# Patient Record
Sex: FEMALE | Race: WHITE | NOT HISPANIC OR LATINO | Employment: UNEMPLOYED | URBAN - METROPOLITAN AREA
[De-identification: names, ages, dates, MRNs, and addresses within clinical notes are randomized per-mention and may not be internally consistent; named-entity substitution may affect disease eponyms.]

---
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
## 2025-03-19 ENCOUNTER — APPOINTMENT (OUTPATIENT)
Dept: GENERAL RADIOLOGY | Facility: HOSPITAL | Age: 20
End: 2025-03-19
Payer: OTHER MISCELLANEOUS

## 2025-03-19 PROCEDURE — 73600 X-RAY EXAM OF ANKLE: CPT

## 2025-03-19 PROCEDURE — 99283 EMERGENCY DEPT VISIT LOW MDM: CPT

## 2025-03-19 PROCEDURE — 73590 X-RAY EXAM OF LOWER LEG: CPT

## 2025-03-20 ENCOUNTER — HOSPITAL ENCOUNTER (EMERGENCY)
Facility: HOSPITAL | Age: 20
Discharge: HOME | End: 2025-03-20
Attending: EMERGENCY MEDICINE
Payer: OTHER MISCELLANEOUS

## 2025-03-20 VITALS
DIASTOLIC BLOOD PRESSURE: 78 MMHG | BODY MASS INDEX: 24.99 KG/M2 | RESPIRATION RATE: 20 BRPM | TEMPERATURE: 98.7 F | OXYGEN SATURATION: 100 % | HEIGHT: 65 IN | HEART RATE: 83 BPM | WEIGHT: 150 LBS | SYSTOLIC BLOOD PRESSURE: 122 MMHG

## 2025-03-20 DIAGNOSIS — S93.402A SPRAIN OF LEFT ANKLE, UNSPECIFIED LIGAMENT, INITIAL ENCOUNTER: Primary | ICD-10-CM

## 2025-03-20 RX ORDER — ACETAMINOPHEN 500 MG
1000 TABLET ORAL ONCE
Status: COMPLETED | OUTPATIENT
Start: 2025-03-20 | End: 2025-03-20

## 2025-03-20 RX ADMIN — ACETAMINOPHEN 1000 MG: 500 TABLET, FILM COATED ORAL at 01:07

## 2025-03-20 RX ADMIN — IBUPROFEN 600 MG: 400 TABLET, FILM COATED ORAL at 01:07

## 2025-03-20 NOTE — ED PROVIDER NOTES
EMERGENCY DEPARTMENT ENCOUNTER  Room Number:  05/05  PCP: Provider, No Known  Independent Historians: Patient      HPI:  Chief Complaint: had concerns including Leg Injury.     A complete HPI/ROS/PMH/PSH/SH/FH are unobtainable due to: None    Chronic or social conditions impacting patient care (Social Determinants of Health): None      Context: Camila Mandujano is a 19 y.o. female who presents emergency department today with a left ankle injury after falling off a stepstool at work.  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  She denies numbness, tingling, or difficulty moving the toes.  She reports that she has had 2 other surgeries on this ankle for history of torn ligaments.  This was performed in [place].  She denies any other injuries associated with the fall.  She is not anticoagulated.      Review of prior external notes (non-ED) -and- Review of prior external test results outside of this encounter:   I reviewed labs from 9/1/2024, hemoglobin 14.2, creatinine 0.72      PAST MEDICAL HISTORY  Active Ambulatory Problems     Diagnosis Date Noted    No Active Ambulatory Problems     Resolved Ambulatory Problems     Diagnosis Date Noted    No Resolved Ambulatory Problems     No Additional Past Medical History         PAST SURGICAL HISTORY  No past surgical history on file.      FAMILY HISTORY  No family history on file.      SOCIAL HISTORY  Social History     Socioeconomic History    Marital status: Single   Tobacco Use    Smoking status: Unknown         ALLERGIES  Patient has no known allergies.      REVIEW OF SYSTEMS  Included in HPI  All systems reviewed and negative except for those discussed in HPI.      PHYSICAL EXAM    I have reviewed the triage vital signs and nursing notes.    ED Triage Vitals   Temp Heart Rate Resp BP SpO2   03/19/25 2221 03/19/25 2221 03/19/25 2221 03/19/25 2226 03/19/25 2221    98.7 °F (37.1 °C) 104 20 131/83 97 %      Temp src Heart Rate Source Patient Position BP Location FiO2 (%)   -- -- -- -- --              Physical Exam  Constitutional:       General: She is not in acute distress.     Appearance: Normal appearance.   HENT:      Head: Normocephalic and atraumatic.      Nose: Nose normal.      Mouth/Throat:      Mouth: Mucous membranes are moist.   Eyes:      Extraocular Movements: Extraocular movements intact.      Pupils: Pupils are equal, round, and reactive to light.   Cardiovascular:      Rate and Rhythm: Normal rate and regular rhythm.      Pulses: Normal pulses.      Heart sounds: Normal heart sounds.   Pulmonary:      Effort: Pulmonary effort is normal. No respiratory distress.      Breath sounds: Normal breath sounds.   Musculoskeletal:         General: Normal range of motion.      Cervical back: Normal range of motion and neck supple.      Comments: There is swelling to the lateral malleolus of the left ankle.  Range of motion limited secondary to pain.  Can move all toes without difficulty.  Sensation is intact to light touch throughout the bilateral lower extremities. Muscle strength is 5/5 and symmetrical with plantarflexion and EHL. DP and PT pulses are 2+ bilaterally.      Skin:     General: Skin is warm and dry.      Capillary Refill: Capillary refill takes less than 2 seconds.   Neurological:      General: No focal deficit present.      Mental Status: She is alert and oriented to person, place, and time.   Psychiatric:         Mood and Affect: Mood normal.         Behavior: Behavior normal.           RADIOLOGY  XR Tibia Fibula 2 View Left  XR Tibia Fibula 2 View Left, XR Ankle 2 View Left  Result Date: 3/19/2025  2 VIEWS LEFT TIBIA AND FIBULA; 2 VIEWS LEFT ANKLE  HISTORY: Fall  COMPARISON: March 25, 2022  FINDINGS: Left tibia and fibula: No acute fracture or subluxation of the left tibia or fibula is seen. There is no suprapatellar effusion. No aggressive osseous  abnormalities are seen.  Left ankle: No acute fracture or subluxation of the left ankle is seen. There is soft tissue swelling about the ankle, particularly overlying the lateral malleolus.      No acute fracture or subluxation identified.  This report was finalized on 3/19/2025 11:36 PM by Dr. Beth Andrade M.D on Workstation: [id number]          MEDICATIONS GIVEN IN ER  Medications   ibuprofen (ADVIL,MOTRIN) tablet 600 mg (has no administration in time range)   acetaminophen (TYLENOL) tablet 1,000 mg (has no administration in time range)         OUTPATIENT MEDICATION MANAGEMENT:  Current Facility-Administered Medications Ordered in Epic   Medication Dose Route Frequency Provider Last Rate Last Admin    acetaminophen (TYLENOL) tablet 1,000 mg  1,000 mg Oral Once Bridget Potts PA-C        ibuprofen (ADVIL,MOTRIN) tablet 600 mg  600 mg Oral Once Bridget Potts PA-C         Current Outpatient Medications Ordered in Epic   Medication Sig Dispense Refill    diclofenac (VOLTAREN) 75 MG EC tablet Take 1 tablet by mouth 2 (Two) Times a Day As Needed (Pain) for up to 20 doses. 20 tablet 0    traMADol (Ultram) 50 MG tablet Take 1 tablet by mouth Every 6 (Six) Hours As Needed for Moderate Pain. 20 tablet 0         PROGRESS, DATA ANALYSIS, CONSULTS, AND MEDICAL DECISION MAKING  ORDERS PLACED DURING THIS VISIT:  Orders Placed This Encounter   Procedures    [facility] 14. Crutches (), 09. AirCast Ankle Stirrup (); Left; Left sided injury    XR Tibia Fibula 2 View Left    XR Ankle 2 View Left       All labs have been independently interpreted by me.  All radiology studies have been reviewed by me. All EKG's have been independently viewed and interpreted by me.  Discussion below represents my analysis of pertinent findings related to patient's condition, differential diagnosis, treatment plan and final disposition.    Differential diagnosis includes but is not limited to:   Ankle sprain, fracture,  dislocation    ED Course:  ED Course as of 03/20/25 0100   Thu Mar 20, 2025   0034 XR Ankle 2 View Left  My independent interpretation of the ankle x-ray is no acute fracture [CC]   0034 XR Tibia Fibula 2 View Left  My independent interpretation of the x-ray is no acute fracture [CC]   0052 I discussed the case with Dr. Sagastume and he agrees to evaluate the patient at the bedside.    [CC]   0100 Patient is a 19-year-old female presents emergency department today with a left ankle injury.  On arrival here in the emergency department vitals are reassuring, she is afebrile.  On my exam the patient has swelling to the lateral malleolus.  She has neuro vastly intact.  She was evaluated with x-rays no acute fractures were identified.  Suspect ankle sprain.  Will treat with anti-inflammatories and Aircast.  Advance weightbearing as tolerated.  She will need to follow-up with orthopedics in the office.  May need MRI if symptoms or not improving with conservative measures.  Return precautions were given.  She is appropriate for discharge with outpatient follow-up. [CC]      ED Course User Index  [CC] Bridget Potts PA-C           AS OF 01:00 EDT VITALS:    BP - 131/83  HR - 104  TEMP - 98.7 °F (37.1 °C)  O2 SATS - 97%        DIAGNOSIS  Final diagnoses:   Sprain of left ankle, unspecified ligament, initial encounter         DISPOSITION  ED Disposition       ED Disposition   Discharge    Condition   Stable    Comment   --                      Please note that portions of this document were completed with a voice recognition program.    Note Disclaimer: At [facility], we believe that sharing information builds trust and better relationships. You are receiving this note because you recently visited [facility]. It is possible you will see health information before a provider has talked with you about it. This kind of information can be easy to misunderstand. To help you fully understand what it means for your health,  we urge you to discuss this note with your provider.     Bridget Potts PA-C  03/20/25 0100

## 2025-03-20 NOTE — ED PROVIDER NOTES
MD ATTESTATION NOTE  I supervised care provided by the midlevel provider. We have discussed this patient's history, physical exam, and treatment plan. I have reviewed the midlevel provider's note and I agree with the midlevel provider's findings and plan of care.   SHARED VISIT: This visit was performed by BOTH a physician and an APC. The substantive portion of the medical decision making was performed by this attesting physician who made or approved the management plan and takes responsibility for patient management. All studies in the APC note (if performed) were independently interpreted by me.   I have personally had a face to face encounter with the patient.     PCP: Provider, No Known  Patient Care Team:  Provider, No Known as PCP -      Camila Mandujano is a 19 y.o. female who presents to the ED c/o left ankle injury.  Patient reports that she fell off a stool and inverted her left ankle.  Patient complains of dull pain worse with movement.  No head injury, no loss consciousness, no neck or back pain.  Patient reports that she was baseline health prior to fall.    On exam:  General: NAD.  Head: NCAT.  ENT: nares patent, no scleral icterus  Neck: Supple, trachea midline.  Cardiac: regular rate and rhythm.  Lungs: normal effort, clear to auscultation bilaterally  Abdomen: Soft, nondistended, NTTP, no rebound tenderness, no guarding or rigidity.   Extremities: Moves all extremities well.  Left lower extremity: Tenderness and swelling of left lateral malleolus, no crepitus or deformity,  Ankle/toes up/down, sensation intact light touch all peripheral nerve distributions, 2+ dorsalis pedis and posterior tibial pulses, brisk cap refill all digits.  Neuro: alert, MAEW, follows commands  Psych: calm, cooperative  Skin: Warm, dry.    Medical Decision Making:  After the initial H&P, I discussed pertinent information from history and physical exam with patient/family.  Discussed differential diagnosis.   Discussed plan for ED evaluation/work-up/treatment.  All questions answered.  Patient/family is agreeable with plan.    ED Course as of 03/20/25 0136   Thu Mar 20, 2025   0034 XR Ankle 2 View Left  My independent interpretation of the ankle x-ray is no acute fracture [CC]   0034 XR Tibia Fibula 2 View Left  My independent interpretation of the x-ray is no acute fracture [CC]   0052 I discussed the case with Dr. Sagastume and he agrees to evaluate the patient at the bedside.    [CC]   0100 Patient is a 19-year-old female presents emergency department today with a left ankle injury.  On arrival here in the emergency department vitals are reassuring, she is afebrile.  On my exam the patient has swelling to the lateral malleolus.  She has neuro vastly intact.  She was evaluated with x-rays no acute fractures were identified.  Suspect ankle sprain.  Will treat with anti-inflammatories and Aircast.  Advance weightbearing as tolerated.  She will need to follow-up with orthopedics in the office.  May need MRI if symptoms or not improving with conservative measures.  Return precautions were given.  She is appropriate for discharge with outpatient follow-up. [CC]      ED Course User Index  [CC] Bridget Potts, PAChristopherC       Diagnosis  Final diagnoses:   Sprain of left ankle, unspecified ligament, initial encounter          Dilan Sagastume MD  03/20/25 0136

## 2025-03-20 NOTE — ED NOTES
Pt to ED c/o L leg injury where she fell off a ladder at work. Pt states she is still able to feel her toes and wiggle.

## 2025-03-20 NOTE — Clinical Note
[facility] EMERGENCY DEPARTMENT  [address]  [facility] [address]  Phone: [phone]    Camila Mandujano was seen and treated in our emergency department on 3/19/2025.  She may return to work on 03/24/2025.  Toe touch weight bearing until you follow up and are cleared by orthopedics        Thank you for choosing [facility].    Dilan Sagastume MD

## 2025-03-20 NOTE — DISCHARGE INSTRUCTIONS
Rest, ice, elevate. Wear the air cast until pain free.   Increase weight-bearing gradually as tolerated.  Take ibuprofen and Tylenol as needed for pain.  Recheck with orthopaedist of your choice or the one listed above.  Return to the ER as needed.    Please follow-up with your PCP and orthopedics    Although you are being discharged in the ED today, I encouraged her to return for worsening symptoms.  Things can, and do, change such a treatment at home with medication may not be adequate.  Specifically I recommend returning for chest pain or discomfort, difficulty breathing, persistent vomiting or difficulty holding down liquids or medications, fever > 102.0 F,  or any other worsening or alarming symptoms.     You have been evaluated in the emergency department for your presenting symptoms and deemed appropriate for discharge home.  Understand that your health care does not end here today.  It is important that your primary care physician be made aware of your visit.  I recommend calling your primary care provider in the next 48 hours to arrange follow-up care.  Your primary care provider needs to review your treatment and recovery to ensure that no further treatment is necessary.  Additional testing or procedures may be necessary as an outpatient at the discretion of your primary care provider.    I also recommend following up with your PCP for recheck of your blood pressure and treatment as needed.